# Patient Record
Sex: MALE | Race: WHITE | NOT HISPANIC OR LATINO | Employment: FULL TIME | URBAN - METROPOLITAN AREA
[De-identification: names, ages, dates, MRNs, and addresses within clinical notes are randomized per-mention and may not be internally consistent; named-entity substitution may affect disease eponyms.]

---

## 2017-10-16 ENCOUNTER — OFFICE VISIT (OUTPATIENT)
Dept: DERMATOLOGY | Facility: CLINIC | Age: 22
End: 2017-10-16
Payer: COMMERCIAL

## 2017-10-16 DIAGNOSIS — L70.9 ACNE, UNSPECIFIED ACNE TYPE: Primary | ICD-10-CM

## 2017-10-16 PROCEDURE — 99202 OFFICE O/P NEW SF 15 MIN: CPT | Mod: S$GLB,,, | Performed by: NURSE PRACTITIONER

## 2017-10-16 PROCEDURE — 99999 PR PBB SHADOW E&M-EST. PATIENT-LVL III: CPT | Mod: PBBFAC,,, | Performed by: NURSE PRACTITIONER

## 2017-10-16 RX ORDER — MINOCYCLINE HYDROCHLORIDE 90 MG/1
TABLET, FILM COATED, EXTENDED RELEASE ORAL
Qty: 30 EACH | Refills: 1 | Status: SHIPPED | OUTPATIENT
Start: 2017-10-16 | End: 2019-10-10

## 2017-10-16 RX ORDER — DAPSONE 75 MG/G
GEL TOPICAL
Qty: 60 G | Refills: 3 | Status: SHIPPED | OUTPATIENT
Start: 2017-10-16 | End: 2019-10-10

## 2017-10-16 RX ORDER — ADAPALENE AND BENZOYL PEROXIDE 3; 25 MG/G; MG/G
GEL TOPICAL
Qty: 45 G | Refills: 3 | Status: SHIPPED | OUTPATIENT
Start: 2017-10-16 | End: 2019-10-10

## 2017-10-16 NOTE — PATIENT INSTRUCTIONS

## 2017-10-16 NOTE — PROGRESS NOTES
Subjective:       Patient ID:  Nelson Burden is a 22 y.o. male who presents for   Chief Complaint   Patient presents with    Acne     Face     Acne  - Initial  Affected locations: face  Duration: 5 years  Signs / symptoms: inflamed, irritated, redness and non-healing (Scarring)  Severity: moderate  Timing: constant  Aggravated by: nothing  Relieving factors/Treatments tried: OTC acne medication  Improvement on treatment: no relief        Review of Systems   Constitutional: Negative for fever, chills, weight loss, weight gain, fatigue, night sweats and malaise.   HENT: Negative for nosebleeds and headaches.    Gastrointestinal: Negative for diarrhea and Sensitivity to oral antibiotics.        H/o bloody diarrhea. Last episode was 3/4 years ago. Has had 3 colonscopy's that were negative.   Genitourinary: Negative for irregular periods.   Musculoskeletal: Negative for arthralgias.   Skin: Positive for activity-related sunscreen use. Negative for daily sunscreen use and recent sunburn.   Neurological: Negative for headaches.   Psychiatric/Behavioral: Negative for depressed mood.   Hematologic/Lymphatic: Does not bruise/bleed easily.        Objective:    Physical Exam   Constitutional: He appears well-developed and well-nourished. No distress.   Neurological: He is alert and oriented to person, place, and time. He is not disoriented.   Psychiatric: He has a normal mood and affect.   Skin:   Areas Examined (abnormalities noted in diagram):   Head / Face Inspection Performed  Neck Inspection Performed  Chest / Axilla Inspection Performed  Back Inspection Performed  RUE Inspected  LUE Inspection Performed                   Diagram Legend     Erythematous scaling macule/papule c/w actinic keratosis       Vascular papule c/w angioma      Pigmented verrucoid papule/plaque c/w seborrheic keratosis      Yellow umbilicated papule c/w sebaceous hyperplasia      Irregularly shaped tan macule c/w lentigo     1-2 mm smooth  white papules consistent with Milia      Movable subcutaneous cyst with punctum c/w epidermal inclusion cyst      Subcutaneous movable cyst c/w pilar cyst      Firm pink to brown papule c/w dermatofibroma      Pedunculated fleshy papule(s) c/w skin tag(s)      Evenly pigmented macule c/w junctional nevus     Mildly variegated pigmented, slightly irregular-bordered macule c/w mildly atypical nevus      Flesh colored to evenly pigmented papule c/w intradermal nevus       Pink pearly papule/plaque c/w basal cell carcinoma      Erythematous hyperkeratotic cursted plaque c/w SCC      Surgical scar with no sign of skin cancer recurrence      Open and closed comedones      Inflammatory papules and pustules      Verrucoid papule consistent consistent with wart     Erythematous eczematous patches and plaques     Dystrophic onycholytic nail with subungual debris c/w onychomycosis     Umbilicated papule    Erythematous-base heme-crusted tan verrucoid plaque consistent with inflamed seborrheic keratosis     Erythematous Silvery Scaling Plaque c/w Psoriasis     See annotation      Assessment / Plan:        Acne, unspecified acne type  -     ACZONE 7.5 % GlwP; AAA face daily - bid  Dispense: 60 g; Refill: 3  -     EPIDUO FORTE 0.3-2.5 % GlwP; AAA face qhs  Dispense: 45 g; Refill: 3  -     minocycline 90 mg Tb24; 1 tab in morning with food  Dispense: 30 each; Refill: 1    Morning: wash with non medicated wash, apply panoxyl 4%, aczone for spot treatment, followed by moisturizer with spf  Evening: Wash with medicated wash, apply epiduo, and moisturizer.    Patient cautioned that Benzoyl Peroxide can act as a bleaching agent removing color from clothing, towels, pillowcases. Care should be taken to avoid contact with fabrics.     Discussed benefits and risks of minocycline therapy including but not limited to vertigo, tinnitus, lupus-like syndrome, drug-induced hepatitis, increased sun sensitivity, and blue-black pigmentation of  skin.           Return in about 8 weeks (around 12/11/2017).

## 2019-10-10 ENCOUNTER — PATIENT MESSAGE (OUTPATIENT)
Dept: INTERNAL MEDICINE | Facility: CLINIC | Age: 24
End: 2019-10-10

## 2019-10-10 ENCOUNTER — IMMUNIZATION (OUTPATIENT)
Dept: PHARMACY | Facility: CLINIC | Age: 24
End: 2019-10-10
Payer: MEDICAID

## 2019-10-10 ENCOUNTER — CLINICAL SUPPORT (OUTPATIENT)
Dept: INTERNAL MEDICINE | Facility: CLINIC | Age: 24
End: 2019-10-10
Payer: MEDICAID

## 2019-10-10 ENCOUNTER — OFFICE VISIT (OUTPATIENT)
Dept: INTERNAL MEDICINE | Facility: CLINIC | Age: 24
End: 2019-10-10
Payer: MEDICAID

## 2019-10-10 VITALS
OXYGEN SATURATION: 98 % | HEART RATE: 115 BPM | WEIGHT: 218.5 LBS | HEIGHT: 72 IN | BODY MASS INDEX: 29.59 KG/M2 | DIASTOLIC BLOOD PRESSURE: 83 MMHG | SYSTOLIC BLOOD PRESSURE: 153 MMHG

## 2019-10-10 DIAGNOSIS — Z00.00 ANNUAL PHYSICAL EXAM: Primary | ICD-10-CM

## 2019-10-10 DIAGNOSIS — F41.9 ANXIETY: ICD-10-CM

## 2019-10-10 DIAGNOSIS — R19.7 DIARRHEA, UNSPECIFIED TYPE: ICD-10-CM

## 2019-10-10 PROCEDURE — 99214 OFFICE O/P EST MOD 30 MIN: CPT | Mod: PBBFAC,25 | Performed by: FAMILY MEDICINE

## 2019-10-10 PROCEDURE — 90471 IMMUNIZATION ADMIN: CPT | Mod: PBBFAC

## 2019-10-10 PROCEDURE — 99385 PR PREVENTIVE VISIT,NEW,18-39: ICD-10-PCS | Mod: S$PBB,,, | Performed by: FAMILY MEDICINE

## 2019-10-10 PROCEDURE — 99999 PR PBB SHADOW E&M-EST. PATIENT-LVL IV: CPT | Mod: PBBFAC,,, | Performed by: FAMILY MEDICINE

## 2019-10-10 PROCEDURE — 99385 PREV VISIT NEW AGE 18-39: CPT | Mod: S$PBB,,, | Performed by: FAMILY MEDICINE

## 2019-10-10 PROCEDURE — 99999 PR PBB SHADOW E&M-EST. PATIENT-LVL I: CPT | Mod: PBBFAC,,,

## 2019-10-10 PROCEDURE — 99999 PR PBB SHADOW E&M-EST. PATIENT-LVL IV: ICD-10-PCS | Mod: PBBFAC,,, | Performed by: FAMILY MEDICINE

## 2019-10-10 PROCEDURE — 99211 OFF/OP EST MAY X REQ PHY/QHP: CPT | Mod: PBBFAC,27

## 2019-10-10 PROCEDURE — 99999 PR PBB SHADOW E&M-EST. PATIENT-LVL I: ICD-10-PCS | Mod: PBBFAC,,,

## 2019-10-10 NOTE — PATIENT INSTRUCTIONS
Please visit "[employer] and/or download the [employer] leandra on your phone for therapy options. These are great resources and helpful when searching out therapists that are affordable, around your area, and even have the ability to use while at home. Other options include contacting your insurance company to see therapists that they recommend. Please don't hesitate to ask me if any further questions arise.

## 2019-10-10 NOTE — PROGRESS NOTES
"Subjective:       Patient ID: Nelson Burden is a 24 y.o. male.    Chief Complaint: Establish Care and Employment Physical    HPI    24yoM presents to [facility].     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 Flu and tdap today as well.  Will need TB skin test but no sooner than 11/18/19    #Elevated blood pressure reading  - elevated today in clinic, ?white coat htn  - recent weight changes, put on weight  - started intermittent fasting over the past week and a new exercise regimen.    #Anxiety:  - no trouble sleeping, feels "social anxiety", started feeling in high school during jobs and "crippled" him, shaking and unable to perform.  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    #GI: mother has h/o crohn's  - formerly est with Dr. Cotter (pediatric GI) (last charted in 2011) for h/o blood stools and diarrhea. Cscope in 2011. 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 Negative for activity change.   HENT: Negative for hearing loss, rhinorrhea and trouble swallowing.    Eyes: Negative for discharge and visual disturbance.   Respiratory: Negative for chest tightness and wheezing.    Cardiovascular: Negative for chest pain and palpitations.   Gastrointestinal: Positive for diarrhea. Negative for blood in stool, constipation and vomiting.   Endocrine: Negative for polydipsia and polyuria.   Genitourinary: Negative for difficulty urinating, hematuria and urgency.   Musculoskeletal: Negative for arthralgias, joint swelling and neck pain.   Neurological: Negative for " weakness and headaches.   Psychiatric/Behavioral: Negative for confusion and dysphoric mood.         History reviewed. No pertinent past medical history.     Prior to Admission medications    Medication Sig Start Date End Date Taking? Authorizing Provider   ACZONE 7.5 % GlwP AAA face daily - bid 10/16/17   Tomasa Noble NP   EPIDUO FORTE 0.3-2.5 % GlwP AAA face qhs 10/16/17   Tomasa Noble NP   minocycline 90 mg Tb24 1 tab in morning with food 10/16/17   Tomasa Noble NP        Past medical history, surgical history, and family medical history reviewed and updated as appropriate.    Medications and allergies reviewed.     Objective:          Vitals:    10/10/19 1334   BP: (!) 153/83   BP Location: Left arm   Patient Position: Sitting   BP Method: Medium (Manual)   Pulse: (!) 115   SpO2: 98%   Weight: 99.1 kg (218 lb 7.6 oz)   Height: 6' (1.829 m)     Body mass index is 29.63 kg/m².  Physical Exam   Constitutional: He is oriented to person, place, and time. He appears well-developed and well-nourished.   Eyes: Pupils are equal, round, and reactive to light. EOM are normal.   Neck: Normal range of motion.   Cardiovascular: Normal rate, regular rhythm and normal heart sounds.   No murmur heard.  Pulmonary/Chest: Effort normal and breath sounds normal. No respiratory distress. He has no wheezes.   Abdominal: Soft. Bowel sounds are normal. He exhibits no distension. There is no tenderness.   Musculoskeletal: Normal range of motion.   Neurological: He is alert and oriented to person, place, and time.   Skin: Skin is warm. No rash noted.   Psychiatric: He has a normal mood and affect.       Lab Results   Component Value Date    WBC 6.90 10/10/2019    HGB 15.5 10/10/2019    HCT 43.7 10/10/2019     10/10/2019    CHOL 162 07/13/2011    ALT 24 10/10/2019    AST 17 10/10/2019     10/10/2019    K 4.2 10/10/2019     10/10/2019    CREATININE 1.0 10/10/2019    BUN 13 10/10/2019    CO2 28  10/10/2019       Assessment:       1. Annual physical exam    2. Diarrhea, unspecified type    3. Anxiety        Plan:   Nelson SUBRAMANIAN was seen today for establish care and employment physical.    Diagnoses and all orders for this visit:    Annual physical exam  -     Comprehensive metabolic panel; Future  -     CBC auto differential; Future  -     Lipid panel; Future  -     Hemoglobin A1c; Future  -     TSH; Future  -     Rubella antibody, IgG; Future  -     Rubeola antibody IgG; Future  -     Mumps, IgG Screen; Future  -     Hepatitis B Surface Antibody, Qual/Quant; Future  -     Varicella zoster antibody, IgG; Future  - f/u in 1 month s/t elev bp and anxiety, write down readings of home bp if checks, ?white coat htn, no symptoms today.    Diarrhea, unspecified type  - consider GI referral given symptoms in future though patient doing well currently and would like to see how symptoms progress.    Anxiety  - labs today, f/u results, recommending therapy options and patient has already started with self-mindfulness training and trying to get on regimen with exercise and proper diet, will consider meds in future.       Health maintenance reviewed with patient.     No follow-ups on file.    Serafin Driscoll MD  Family Medicine  [facility]  10/10/2019

## 2019-10-11 ENCOUNTER — TELEPHONE (OUTPATIENT)
Dept: INTERNAL MEDICINE | Facility: CLINIC | Age: 24
End: 2019-10-11

## 2019-10-11 NOTE — TELEPHONE ENCOUNTER
----- Message from Geri Silva sent at 10/10/2019  2:30 PM CDT -----  Pt is medicade cannot schedule for him.  He need a 6 week apt with Dr Driscoll.  He would like 11/18  Please schedule

## 2019-10-12 ENCOUNTER — PATIENT MESSAGE (OUTPATIENT)
Dept: INTERNAL MEDICINE | Facility: CLINIC | Age: 24
End: 2019-10-12

## 2019-10-12 DIAGNOSIS — Z23 ENCOUNTER FOR IMMUNIZATION: Primary | ICD-10-CM

## 2019-10-16 DIAGNOSIS — Z23 ENCOUNTER FOR IMMUNIZATION: Primary | ICD-10-CM

## 2019-10-16 NOTE — TELEPHONE ENCOUNTER
"Please call patient and let him know that I spoke with our pharmacist here and she said that since your insurance won't cover, I can place a referral to our Infectious Disease Department for the injection (would be covered by insurance because it takes place in a medical setting). They will call to set up an "appointment time" for this vaccination. Let me know if any questions.  "

## 2019-10-17 ENCOUNTER — CLINICAL SUPPORT (OUTPATIENT)
Dept: INFECTIOUS DISEASES | Facility: CLINIC | Age: 24
End: 2019-10-17
Payer: MEDICAID

## 2019-10-17 PROCEDURE — 90471 IMMUNIZATION ADMIN: CPT | Mod: PBBFAC

## 2019-10-23 ENCOUNTER — TELEPHONE (OUTPATIENT)
Dept: INTERNAL MEDICINE | Facility: CLINIC | Age: 24
End: 2019-10-23

## 2019-10-24 ENCOUNTER — PATIENT MESSAGE (OUTPATIENT)
Dept: INTERNAL MEDICINE | Facility: CLINIC | Age: 24
End: 2019-10-24

## 2019-10-25 ENCOUNTER — CLINICAL SUPPORT (OUTPATIENT)
Dept: INFECTIOUS DISEASES | Facility: CLINIC | Age: 24
End: 2019-10-25
Payer: MEDICAID

## 2019-10-25 DIAGNOSIS — Z23 ENCOUNTER FOR IMMUNIZATION: Primary | ICD-10-CM

## 2019-10-25 PROCEDURE — 99999 PR PBB SHADOW E&M-EST. PATIENT-LVL I: CPT | Mod: PBBFAC,,,

## 2019-10-25 PROCEDURE — 99999 PR PBB SHADOW E&M-EST. PATIENT-LVL I: ICD-10-PCS | Mod: PBBFAC,,,

## 2019-10-25 PROCEDURE — 90707 MMR VACCINE SC: CPT | Mod: PBBFAC

## 2019-10-25 PROCEDURE — 90471 IMMUNIZATION ADMIN: CPT | Mod: PBBFAC

## 2019-10-25 PROCEDURE — 99211 OFF/OP EST MAY X REQ PHY/QHP: CPT | Mod: PBBFAC

## 2019-10-25 RX ADMIN — MEASLES, MUMPS, AND RUBELLA VIRUS VACCINE LIVE 0.5 ML: 1000; 12500; 1000 INJECTION, POWDER, LYOPHILIZED, FOR SUSPENSION SUBCUTANEOUS at 10:10

## 2019-11-17 ENCOUNTER — PATIENT MESSAGE (OUTPATIENT)
Dept: INTERNAL MEDICINE | Facility: CLINIC | Age: 24
End: 2019-11-17

## 2019-11-20 ENCOUNTER — CLINICAL SUPPORT (OUTPATIENT)
Dept: INFECTIOUS DISEASES | Facility: CLINIC | Age: 24
End: 2019-11-20
Payer: MEDICAID

## 2019-11-20 ENCOUNTER — OFFICE VISIT (OUTPATIENT)
Dept: INTERNAL MEDICINE | Facility: CLINIC | Age: 24
End: 2019-11-20
Payer: MEDICAID

## 2019-11-20 VITALS
HEART RATE: 105 BPM | SYSTOLIC BLOOD PRESSURE: 162 MMHG | BODY MASS INDEX: 14 KG/M2 | HEIGHT: 72 IN | DIASTOLIC BLOOD PRESSURE: 82 MMHG | WEIGHT: 103.38 LBS | OXYGEN SATURATION: 99 %

## 2019-11-20 DIAGNOSIS — R03.0 ELEVATED BLOOD PRESSURE READING WITHOUT DIAGNOSIS OF HYPERTENSION: ICD-10-CM

## 2019-11-20 DIAGNOSIS — Z11.1 ENCOUNTER FOR PPD TEST: Primary | ICD-10-CM

## 2019-11-20 DIAGNOSIS — F41.9 ANXIETY: ICD-10-CM

## 2019-11-20 PROBLEM — L70.9 ACNE: Status: RESOLVED | Noted: 2017-10-16 | Resolved: 2019-11-20

## 2019-11-20 PROBLEM — R19.7 DIARRHEA: Status: RESOLVED | Noted: 2019-10-10 | Resolved: 2019-11-20

## 2019-11-20 PROCEDURE — 86580 TB INTRADERMAL TEST: CPT | Mod: PBBFAC

## 2019-11-20 PROCEDURE — 99999 PR PBB SHADOW E&M-EST. PATIENT-LVL III: ICD-10-PCS | Mod: PBBFAC,,, | Performed by: FAMILY MEDICINE

## 2019-11-20 PROCEDURE — 99214 OFFICE O/P EST MOD 30 MIN: CPT | Mod: S$PBB,,, | Performed by: FAMILY MEDICINE

## 2019-11-20 PROCEDURE — 99213 OFFICE O/P EST LOW 20 MIN: CPT | Mod: PBBFAC | Performed by: FAMILY MEDICINE

## 2019-11-20 PROCEDURE — 90471 IMMUNIZATION ADMIN: CPT | Mod: PBBFAC

## 2019-11-20 PROCEDURE — 99999 PR PBB SHADOW E&M-EST. PATIENT-LVL III: CPT | Mod: PBBFAC,,, | Performed by: FAMILY MEDICINE

## 2019-11-20 PROCEDURE — 99214 PR OFFICE/OUTPT VISIT, EST, LEVL IV, 30-39 MIN: ICD-10-PCS | Mod: S$PBB,,, | Performed by: FAMILY MEDICINE

## 2019-11-20 NOTE — PROGRESS NOTES
"Subjective:       Patient ID: Nelson Burden is a 24 y.o. male.    Chief Complaint: Follow-up    HPI    24yoM here for follow up. 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     #Elevated blood pressure reading wo dx htn  - elevated today in clinic, ?white coat htn  - takes at local pharmacy and averages closer to 130s/80s.  - recent increased weight  - started intermittent fasting and a new exercise regimen.     #Anxiety:  - no trouble sleeping, feels "social anxiety", started feeling in high school during jobs and "crippled" him, shaking and unable to perform.  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     #GI: mother has h/o crohn's  - formerly est with Dr. Cotter (pediatric GI) (last charted in 2011) for h/o blood stools and diarrhea. Cscope in 2011. 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   HENT: Negative for ear pain, sinus pain and sore throat.    Eyes: Negative for discharge.   Respiratory: Negative for cough, shortness of breath and wheezing.    Cardiovascular: Negative for chest pain and palpitations.   Gastrointestinal: Negative for abdominal pain, constipation, diarrhea, nausea and vomiting.   Genitourinary: Negative for difficulty urinating, dysuria and hematuria.   Musculoskeletal: Negative for back pain.   Skin: Negative for rash.   Neurological: Negative for weakness, numbness and headaches.   Psychiatric/Behavioral: Negative for dysphoric mood.         Past Medical History:   Diagnosis Date    Acne 10/16/2017    Weight #195lbs (88kg)    Diarrhea 10/10/2019        Prior to " Admission medications    Medication Sig Start Date End Date Taking? Authorizing Provider   hepatitis B virus vacc.rec,PF, (ENGERIX-B) 20 mcg/mL Syrg Inject into the muscle. 10/15/19   Dorian Rosen, [place]        Past medical history, surgical history, and family medical history reviewed and updated as appropriate.    Medications and allergies reviewed.     Objective:          Vitals:    11/20/19 1315   BP: (!) 162/82   BP Location: Left arm   Patient Position: Sitting   BP Method: Medium (Manual)   Pulse: 105   SpO2: 99%   Weight: 46.9 kg (103 lb 6.3 oz)   Height: 6' (1.829 m)     Body mass index is 14.02 kg/m².  Physical Exam   Constitutional: He is oriented to person, place, and time. He appears well-developed and well-nourished.   Eyes: Pupils are equal, round, and reactive to light. EOM are normal.   Neck: Normal range of motion.   Cardiovascular: Normal rate, regular rhythm and normal heart sounds.   No murmur heard.  Pulmonary/Chest: Effort normal and breath sounds normal. No respiratory distress. He has no wheezes.   Abdominal: Soft. Bowel sounds are normal. He exhibits no distension. There is no tenderness.   Musculoskeletal: Normal range of motion.   Neurological: He is alert and oriented to person, place, and time.   Skin: Skin is warm. No rash noted.   Psychiatric: He has a normal mood and affect.       Lab Results   Component Value Date    WBC 6.90 10/10/2019    HGB 15.5 10/10/2019    HCT 43.7 10/10/2019     10/10/2019    CHOL 235 (H) 10/10/2019    TRIG 455 (H) 10/10/2019    HDL 32 (L) 10/10/2019    ALT 24 10/10/2019    AST 17 10/10/2019     10/10/2019    K 4.2 10/10/2019     10/10/2019    CREATININE 1.0 10/10/2019    BUN 13 10/10/2019    CO2 28 10/10/2019    TSH 1.034 10/10/2019    HGBA1C 4.9 10/10/2019       Assessment:       1. Encounter for PPD test    2. Elevated blood pressure reading without diagnosis of hypertension    3. Anxiety        Plan:   Nelson SUBRAMANIAN was seen today for  follow-up.    Diagnoses and all orders for this visit:    TB skin test today, doing well overall and will continue checking every so often bp readings at local pharm, report back if trending >140/90. Anxiety well controlled today with apps and mindfulness at this time.    Encounter for PPD test  -     POCT TB Skin Test Read    Elevated blood pressure reading without diagnosis of hypertension    Anxiety        Health maintenance reviewed with patient.     Follow up in about 1 year (around 11/20/2020).    Serafin Driscoll MD  Family Medicine  [facility]  11/20/2019

## 2019-11-22 ENCOUNTER — CLINICAL SUPPORT (OUTPATIENT)
Dept: INTERNAL MEDICINE | Facility: CLINIC | Age: 24
End: 2019-11-22
Payer: MEDICAID

## 2019-11-22 LAB
TB INDURATION - 48 HR READ: 0 MM
TB INDURATION - 72 HR READ: NORMAL
TB SKIN TEST - 48 HR READ: NEGATIVE
TB SKIN TEST - 72 HR READ: NORMAL

## 2019-11-22 NOTE — PROGRESS NOTES
"Pt in for ppd reading. PPD negative to right arm. "0"mm duration. Results printed and given to pt.  "

## 2019-11-25 ENCOUNTER — PATIENT MESSAGE (OUTPATIENT)
Dept: INTERNAL MEDICINE | Facility: CLINIC | Age: 24
End: 2019-11-25

## 2019-11-26 ENCOUNTER — CLINICAL SUPPORT (OUTPATIENT)
Dept: INFECTIOUS DISEASES | Facility: CLINIC | Age: 24
End: 2019-11-26
Payer: MEDICAID

## 2019-11-26 PROCEDURE — 90707 MMR VACCINE SC: CPT | Mod: PBBFAC

## 2019-12-09 ENCOUNTER — CLINICAL SUPPORT (OUTPATIENT)
Dept: INTERNAL MEDICINE | Facility: CLINIC | Age: 24
End: 2019-12-09
Payer: MEDICAID

## 2019-12-09 DIAGNOSIS — Z11.1 SCREENING FOR TUBERCULOSIS: Primary | ICD-10-CM

## 2019-12-09 PROCEDURE — 86580 TB INTRADERMAL TEST: CPT | Mod: PBBFAC

## 2019-12-11 ENCOUNTER — CLINICAL SUPPORT (OUTPATIENT)
Dept: INTERNAL MEDICINE | Facility: CLINIC | Age: 24
End: 2019-12-11
Payer: MEDICAID

## 2020-01-13 PROBLEM — Z00.00 ANNUAL PHYSICAL EXAM: Status: RESOLVED | Noted: 2019-10-10 | Resolved: 2020-01-13

## 2021-01-04 ENCOUNTER — PATIENT MESSAGE (OUTPATIENT)
Dept: ADMINISTRATIVE | Facility: HOSPITAL | Age: 26
End: 2021-01-04

## 2021-03-31 ENCOUNTER — IMMUNIZATION (OUTPATIENT)
Dept: PHARMACY | Facility: CLINIC | Age: 26
End: 2021-03-31
Payer: MEDICAID

## 2021-03-31 DIAGNOSIS — Z23 NEED FOR VACCINATION: Primary | ICD-10-CM

## 2021-04-05 ENCOUNTER — PATIENT MESSAGE (OUTPATIENT)
Dept: ADMINISTRATIVE | Facility: HOSPITAL | Age: 26
End: 2021-04-05

## 2021-04-28 ENCOUNTER — IMMUNIZATION (OUTPATIENT)
Dept: PHARMACY | Facility: CLINIC | Age: 26
End: 2021-04-28
Payer: MEDICAID

## 2021-04-28 DIAGNOSIS — Z23 NEED FOR VACCINATION: Primary | ICD-10-CM

## 2021-07-07 ENCOUNTER — PATIENT MESSAGE (OUTPATIENT)
Dept: ADMINISTRATIVE | Facility: HOSPITAL | Age: 26
End: 2021-07-07

## 2021-10-04 ENCOUNTER — PATIENT MESSAGE (OUTPATIENT)
Dept: ADMINISTRATIVE | Facility: HOSPITAL | Age: 26
End: 2021-10-04

## 2022-01-26 ENCOUNTER — PATIENT MESSAGE (OUTPATIENT)
Dept: ADMINISTRATIVE | Facility: HOSPITAL | Age: 27
End: 2022-01-26
Payer: MEDICAID

## 2022-03-16 ENCOUNTER — PATIENT MESSAGE (OUTPATIENT)
Dept: ADMINISTRATIVE | Facility: HOSPITAL | Age: 27
End: 2022-03-16
Payer: MEDICAID

## 2023-01-31 ENCOUNTER — TELEPHONE (OUTPATIENT)
Dept: INTERNAL MEDICINE | Facility: CLINIC | Age: 28
End: 2023-01-31
Payer: MEDICAID

## 2023-01-31 ENCOUNTER — PATIENT MESSAGE (OUTPATIENT)
Dept: INTERNAL MEDICINE | Facility: CLINIC | Age: 28
End: 2023-01-31
Payer: MEDICAID

## 2023-02-02 ENCOUNTER — OFFICE VISIT (OUTPATIENT)
Dept: INTERNAL MEDICINE | Facility: CLINIC | Age: 28
End: 2023-02-02
Payer: MEDICAID

## 2023-02-02 VITALS
BODY MASS INDEX: 30.4 KG/M2 | HEIGHT: 72 IN | SYSTOLIC BLOOD PRESSURE: 136 MMHG | HEART RATE: 88 BPM | OXYGEN SATURATION: 97 % | WEIGHT: 224.44 LBS | DIASTOLIC BLOOD PRESSURE: 92 MMHG

## 2023-02-02 DIAGNOSIS — F90.2 ATTENTION DEFICIT HYPERACTIVITY DISORDER (ADHD), COMBINED TYPE: Primary | ICD-10-CM

## 2023-02-02 PROCEDURE — 99214 OFFICE O/P EST MOD 30 MIN: CPT | Mod: PBBFAC

## 2023-02-02 PROCEDURE — 3075F SYST BP GE 130 - 139MM HG: CPT | Mod: CPTII,,,

## 2023-02-02 PROCEDURE — 99203 PR OFFICE/OUTPT VISIT, NEW, LEVL III, 30-44 MIN: ICD-10-PCS | Mod: S$PBB,,,

## 2023-02-02 PROCEDURE — 1159F MED LIST DOCD IN RCRD: CPT | Mod: CPTII,,,

## 2023-02-02 PROCEDURE — 99203 OFFICE O/P NEW LOW 30 MIN: CPT | Mod: S$PBB,,,

## 2023-02-02 PROCEDURE — 3075F PR MOST RECENT SYSTOLIC BLOOD PRESS GE 130-139MM HG: ICD-10-PCS | Mod: CPTII,,,

## 2023-02-02 PROCEDURE — 3080F PR MOST RECENT DIASTOLIC BLOOD PRESSURE >= 90 MM HG: ICD-10-PCS | Mod: CPTII,,,

## 2023-02-02 PROCEDURE — 3008F BODY MASS INDEX DOCD: CPT | Mod: CPTII,,,

## 2023-02-02 PROCEDURE — 3008F PR BODY MASS INDEX (BMI) DOCUMENTED: ICD-10-PCS | Mod: CPTII,,,

## 2023-02-02 PROCEDURE — 1159F PR MEDICATION LIST DOCUMENTED IN MEDICAL RECORD: ICD-10-PCS | Mod: CPTII,,,

## 2023-02-02 PROCEDURE — 99999 PR PBB SHADOW E&M-EST. PATIENT-LVL IV: CPT | Mod: PBBFAC,,,

## 2023-02-02 PROCEDURE — 3080F DIAST BP >= 90 MM HG: CPT | Mod: CPTII,,,

## 2023-02-02 PROCEDURE — 99999 PR PBB SHADOW E&M-EST. PATIENT-LVL IV: ICD-10-PCS | Mod: PBBFAC,,,

## 2023-02-02 NOTE — PATIENT INSTRUCTIONS
- Call [phone] is the [facility] referral number to call     - Call [phone] is the outside number     - Look up the [facility] group     - Start looking for psychiatrists near where you are moving now to start the process    - Keep track of your blood pressures at home over the next week and [facility]

## 2023-02-02 NOTE — PROGRESS NOTES
Clinic Note  2/2/2023      Subjective:       Patient ID:  Nelson is a 27 y.o. male being seen for referral to psychiatry for ADHD management.     He reports that he was prescribed Concerta by psychiatry in the past after an ADHD workup but lost his insurance when he turned 27 years old and is in need of a referral. He reports in school he was able to cope through poor homework compliance by making good test scores but was unable to cope once the course load increased.  He also has trouble, with focus at home and completing housework and keeping track of his tasks.       Past Medical History:   Diagnosis Date    Acne 10/16/2017    Weight #195lbs (88kg)    Diarrhea 10/10/2019       No past surgical history on file.    Family History   Problem Relation Age of Onset    Crohn's disease Mother     Depression Father     Bipolar disorder Father     Melanoma Neg Hx        Social History     Socioeconomic History    Marital status:    Tobacco Use    Smoking status: Every Day     Types: Vaping with nicotine    Smokeless tobacco: Current   Substance and Sexual Activity    Alcohol use: Yes    Drug use: Not Currently     Types: Marijuana    Sexual activity: Yes     Partners: Female       Review of Systems   Constitutional:  Negative for chills, diaphoresis, fever and weight loss.   HENT:  Negative for congestion, hearing loss, sinus pain and sore throat.    Eyes:  Negative for blurred vision, double vision and photophobia.   Respiratory:  Negative for cough, shortness of breath and wheezing.    Cardiovascular:  Negative for chest pain, palpitations, orthopnea, leg swelling and PND.   Gastrointestinal:  Negative for abdominal pain, constipation, diarrhea, nausea and vomiting.   Genitourinary:  Negative for dysuria, flank pain and hematuria.   Musculoskeletal:  Negative for falls, joint pain and neck pain.   Skin:  Negative for rash.   Neurological:  Negative for dizziness, tingling, sensory change and headaches.      Medication List with Changes/Refills   Current Medications    HEPATITIS B VIRUS VACC.REC,PF, (ENGERIX-B) 20 MCG/ML SYRG    Inject into the muscle.       Patient Active Problem List   Diagnosis    Anxiety               Objective:      BP (!) 136/92 (BP Location: Right arm, Patient Position: Sitting, BP Method: Large (Manual))   Pulse 88   Ht 6' (1.829 m)   Wt 101.8 kg (224 lb 6.9 oz)   SpO2 97%   BMI 30.44 kg/m²   Estimated body mass index is 30.44 kg/m² as calculated from the following:    Height as of this encounter: 6' (1.829 m).    Weight as of this encounter: 101.8 kg (224 lb 6.9 oz).      Physical Exam  Constitutional:       Appearance: Normal appearance. He is normal weight.   HENT:      Head: Atraumatic.      Right Ear: External ear normal.      Left Ear: External ear normal.      Nose: Nose normal. No congestion or rhinorrhea.      Mouth/Throat:      Mouth: Mucous membranes are moist.      Pharynx: Oropharynx is clear.   Eyes:      General: No scleral icterus.     Extraocular Movements: Extraocular movements intact.      Conjunctiva/sclera: Conjunctivae normal.   Cardiovascular:      Rate and Rhythm: Normal rate and regular rhythm.      Pulses: Normal pulses.      Heart sounds: Normal heart sounds. No murmur heard.    No gallop.   Pulmonary:      Effort: Pulmonary effort is normal.      Breath sounds: Normal breath sounds. No wheezing or rales.   Abdominal:      General: Abdomen is flat. There is no distension.      Palpations: Abdomen is soft.      Tenderness: There is no abdominal tenderness.   Musculoskeletal:         General: No swelling or tenderness. Normal range of motion.      Cervical back: Normal range of motion and neck supple. No tenderness.      Right lower leg: No edema.      Left lower leg: No edema.   Skin:     General: Skin is warm and dry.      Capillary Refill: Capillary refill takes less than 2 seconds.      Coloration: Skin is not jaundiced.      Findings: No rash.    Neurological:      General: No focal deficit present.      Mental Status: He is alert and oriented to person, place, and time. Mental status is at baseline.   Psychiatric:         Mood and Affect: Mood normal.         Behavior: Behavior normal.         Assessment and Plan:         Problem List Items Addressed This Visit    None  Visit Diagnoses       Attention deficit hyperactivity disorder (ADHD), combined type    -  Primary    Relevant Orders    Ambulatory referral/consult to Psychiatry    Ambulatory referral/consult to Psychiatry            Follow Up:       Nelson SUBRAMANIAN was seen today for referral.    Diagnoses and all orders for this visit:    Attention deficit hyperactivity disorder (ADHD), combined type  -     Ambulatory referral/consult to Psychiatry; Future  -     Ambulatory referral/consult to Psychiatry; Future            Other Orders Placed This Visit:  Orders Placed This Encounter   Procedures    Ambulatory referral/consult to Psychiatry    Ambulatory referral/consult to Psychiatry             Interview, Assessment, Findings, and Plan discussed with Dr. Stahl     Follow up in about 3 months (around 5/2/2023).    Nash Deal DO, MS   Internal Medicine

## 2023-08-20 PROCEDURE — 87081 CULTURE SCREEN ONLY: CPT | Performed by: STUDENT IN AN ORGANIZED HEALTH CARE EDUCATION/TRAINING PROGRAM

## 2024-01-31 ENCOUNTER — OFFICE VISIT (OUTPATIENT)
Dept: INTERNAL MEDICINE | Facility: CLINIC | Age: 29
End: 2024-01-31
Payer: MEDICAID

## 2024-01-31 VITALS
SYSTOLIC BLOOD PRESSURE: 140 MMHG | WEIGHT: 188.2 LBS | BODY MASS INDEX: 25.49 KG/M2 | HEART RATE: 79 BPM | HEIGHT: 72 IN | TEMPERATURE: 97.3 F | OXYGEN SATURATION: 98 % | DIASTOLIC BLOOD PRESSURE: 90 MMHG

## 2024-01-31 DIAGNOSIS — Z00.00 ANNUAL PHYSICAL EXAM: Primary | ICD-10-CM

## 2024-01-31 DIAGNOSIS — F90.9 ATTENTION DEFICIT HYPERACTIVITY DISORDER (ADHD), UNSPECIFIED ADHD TYPE: ICD-10-CM

## 2024-01-31 DIAGNOSIS — Z72.89 CURRENT EVERY DAY VAPING: ICD-10-CM

## 2024-01-31 DIAGNOSIS — R53.82 CHRONIC FATIGUE: ICD-10-CM

## 2024-01-31 DIAGNOSIS — R79.89 LOW SERUM TESTOSTERONE LEVEL: Primary | ICD-10-CM

## 2024-01-31 LAB
25(OH)D3 SERPL-MCNC: 24 NG/ML (ref 30–100)
ALBUMIN SERPL-MCNC: 4.9 G/DL (ref 3.5–5.2)
ALBUMIN/GLOB SERPL: 1.9 G/DL
ALP SERPL-CCNC: 75 U/L (ref 39–117)
ALT SERPL W P-5'-P-CCNC: 19 U/L (ref 1–41)
ANION GAP SERPL CALCULATED.3IONS-SCNC: 14.6 MMOL/L (ref 5–15)
AST SERPL-CCNC: 19 U/L (ref 1–40)
BASOPHILS # BLD AUTO: 0.04 10*3/MM3 (ref 0–0.2)
BASOPHILS NFR BLD AUTO: 0.6 % (ref 0–1.5)
BILIRUB SERPL-MCNC: 0.7 MG/DL (ref 0–1.2)
BUN SERPL-MCNC: 16 MG/DL (ref 6–20)
BUN/CREAT SERPL: 17.8 (ref 7–25)
CALCIUM SPEC-SCNC: 9.6 MG/DL (ref 8.6–10.5)
CHLORIDE SERPL-SCNC: 100 MMOL/L (ref 98–107)
CHOLEST SERPL-MCNC: 212 MG/DL (ref 0–200)
CO2 SERPL-SCNC: 24.4 MMOL/L (ref 22–29)
CREAT SERPL-MCNC: 0.9 MG/DL (ref 0.76–1.27)
DEPRECATED RDW RBC AUTO: 40.7 FL (ref 37–54)
EGFRCR SERPLBLD CKD-EPI 2021: 120.8 ML/MIN/1.73
EOSINOPHIL # BLD AUTO: 0.05 10*3/MM3 (ref 0–0.4)
EOSINOPHIL NFR BLD AUTO: 0.8 % (ref 0.3–6.2)
ERYTHROCYTE [DISTWIDTH] IN BLOOD BY AUTOMATED COUNT: 12.5 % (ref 12.3–15.4)
GLOBULIN UR ELPH-MCNC: 2.6 GM/DL
GLUCOSE SERPL-MCNC: 83 MG/DL (ref 65–99)
HCT VFR BLD AUTO: 44.9 % (ref 37.5–51)
HCV AB SER DONR QL: NORMAL
HDLC SERPL-MCNC: 43 MG/DL (ref 40–60)
HGB BLD-MCNC: 15.5 G/DL (ref 13–17.7)
IMM GRANULOCYTES # BLD AUTO: 0.01 10*3/MM3 (ref 0–0.05)
IMM GRANULOCYTES NFR BLD AUTO: 0.2 % (ref 0–0.5)
LDLC SERPL CALC-MCNC: 151 MG/DL (ref 0–100)
LDLC/HDLC SERPL: 3.48 {RATIO}
LYMPHOCYTES # BLD AUTO: 2.43 10*3/MM3 (ref 0.7–3.1)
LYMPHOCYTES NFR BLD AUTO: 37.6 % (ref 19.6–45.3)
MCH RBC QN AUTO: 31.1 PG (ref 26.6–33)
MCHC RBC AUTO-ENTMCNC: 34.5 G/DL (ref 31.5–35.7)
MCV RBC AUTO: 90 FL (ref 79–97)
MONOCYTES # BLD AUTO: 0.78 10*3/MM3 (ref 0.1–0.9)
MONOCYTES NFR BLD AUTO: 12.1 % (ref 5–12)
NEUTROPHILS NFR BLD AUTO: 3.16 10*3/MM3 (ref 1.7–7)
NEUTROPHILS NFR BLD AUTO: 48.7 % (ref 42.7–76)
NRBC BLD AUTO-RTO: 0 /100 WBC (ref 0–0.2)
PLATELET # BLD AUTO: 249 10*3/MM3 (ref 140–450)
PMV BLD AUTO: 9.1 FL (ref 6–12)
POTASSIUM SERPL-SCNC: 4.2 MMOL/L (ref 3.5–5.2)
PROT SERPL-MCNC: 7.5 G/DL (ref 6–8.5)
RBC # BLD AUTO: 4.99 10*6/MM3 (ref 4.14–5.8)
SODIUM SERPL-SCNC: 139 MMOL/L (ref 136–145)
TESTOST SERPL-MCNC: 226 NG/DL (ref 249–836)
TRIGL SERPL-MCNC: 97 MG/DL (ref 0–150)
TSH SERPL DL<=0.05 MIU/L-ACNC: 1.81 UIU/ML (ref 0.27–4.2)
VLDLC SERPL-MCNC: 18 MG/DL (ref 5–40)
WBC NRBC COR # BLD AUTO: 6.47 10*3/MM3 (ref 3.4–10.8)

## 2024-01-31 PROCEDURE — 84403 ASSAY OF TOTAL TESTOSTERONE: CPT | Performed by: NURSE PRACTITIONER

## 2024-01-31 PROCEDURE — 86803 HEPATITIS C AB TEST: CPT | Performed by: NURSE PRACTITIONER

## 2024-01-31 PROCEDURE — 82306 VITAMIN D 25 HYDROXY: CPT | Performed by: NURSE PRACTITIONER

## 2024-01-31 PROCEDURE — 84443 ASSAY THYROID STIM HORMONE: CPT | Performed by: NURSE PRACTITIONER

## 2024-01-31 PROCEDURE — 80061 LIPID PANEL: CPT | Performed by: NURSE PRACTITIONER

## 2024-01-31 PROCEDURE — 80053 COMPREHEN METABOLIC PANEL: CPT | Performed by: NURSE PRACTITIONER

## 2024-01-31 PROCEDURE — 85025 COMPLETE CBC W/AUTO DIFF WBC: CPT | Performed by: NURSE PRACTITIONER

## 2024-01-31 RX ORDER — ERGOCALCIFEROL 1.25 MG/1
CAPSULE ORAL
Qty: 13 CAPSULE | Refills: 0 | Status: SHIPPED | OUTPATIENT
Start: 2024-01-31

## 2024-01-31 NOTE — PROGRESS NOTES
"Chief Complaint  Establish Care (New patient. The patient has no chief complaints. He would like to discuss testosterone levels. He wants blood work today. )  Subjective    History of Present Illness  Gaetano Nash is a 28 y.o. male who presents to [facility] INTERNAL MEDICINE:       1.  To establish care. Moved to the area in the summer.     2.  For His annual physical exam. He is experiencing chronic fatigue and sleepy during the day.    No current outpatient medications on file.  No Known Allergies   Past Medical History:   Diagnosis Date    ADHD (attention deficit hyperactivity disorder)       Past Surgical History:   Procedure Laterality Date    COLONOSCOPY          Objective   /90 (BP Location: Right arm, Patient Position: Sitting, Cuff Size: Adult)   Pulse 79   Temp 97.3 °F (36.3 °C) (Temporal)   Ht 182.9 cm (72\")   Wt 85.4 kg (188 lb 3.2 oz)   SpO2 98%   BMI 25.52 kg/m²    Estimated body mass index is 25.52 kg/m² as calculated from the following:    Height as of this encounter: 182.9 cm (72\").    Weight as of this encounter: 85.4 kg (188 lb 3.2 oz).     Physical Exam  Vitals reviewed.   Constitutional:       General: He is not in acute distress.  HENT:      Head: Normocephalic and atraumatic.      Right Ear: Tympanic membrane and ear canal normal.      Left Ear: Tympanic membrane and ear canal normal.   Eyes:      Conjunctiva/sclera: Conjunctivae normal.   Cardiovascular:      Rate and Rhythm: Normal rate and regular rhythm.      Heart sounds: Normal heart sounds. No murmur heard.  Pulmonary:      Effort: Pulmonary effort is normal.      Breath sounds: Normal breath sounds. No wheezing, rhonchi or rales.   Abdominal:      General: There is no distension.      Palpations: Abdomen is soft. There is no mass.      Tenderness: There is no abdominal tenderness.   Musculoskeletal:      Right lower leg: No edema.      Left lower leg: No edema.   Lymphadenopathy:      Cervical: No " cervical adenopathy.   Skin:     General: Skin is warm and dry.      Coloration: Skin is not jaundiced or pale.   Neurological:      General: No focal deficit present.      Mental Status: He is alert.   Psychiatric:         Mood and Affect: Mood normal.         Thought Content: Thought content normal.             Assessment and Plan   Diagnoses and all orders for this visit:    1. Annual physical exam (Primary)  -     CBC & Differential  -     Comprehensive Metabolic Panel  -     Lipid Panel  -     Hepatitis C Antibody  -     TSH Rfx On Abnormal To Free T4    2. Chronic fatigue  -     Testosterone  -     Vitamin D,25-Hydroxy    3. Current every day vaping    4. Attention deficit hyperactivity disorder (ADHD), unspecified ADHD type  -     Ambulatory Referral to Psychiatry      Discussed healthy diet, exercise, adequate sleep, cancer screening, immunizations and preventative care. Annual eye exam and twice yearly dental cleaning.    BMI is >= 25 and <30. (Overweight) The following options were offered after discussion;: weight loss educational material (shared in after visit summary), exercise counseling/recommendations, and nutrition counseling/recommendations       Patient was given instructions and counseling regarding his condition or for health maintenance advice. Please see specific information pulled into the AVS if appropriate.     Follow Up   Return in about 1 year (around 1/31/2025) for Annual physical.    Dictated Utilizing Dragon Dictation.  Please note that portions of this note were completed with a voice recognition program.  Part of this note may be an electronic transcription/translation of spoken language to printed text using the Dragon Dictation System.    WILLY Sanabria

## 2024-02-02 ENCOUNTER — PATIENT ROUNDING (BHMG ONLY) (OUTPATIENT)
Dept: INTERNAL MEDICINE | Facility: CLINIC | Age: 29
End: 2024-02-02
Payer: MEDICAID

## 2024-02-16 ENCOUNTER — OFFICE VISIT (OUTPATIENT)
Dept: ENDOCRINOLOGY | Age: 29
End: 2024-02-16
Payer: COMMERCIAL

## 2024-02-16 VITALS
HEIGHT: 72 IN | DIASTOLIC BLOOD PRESSURE: 70 MMHG | WEIGHT: 185 LBS | OXYGEN SATURATION: 99 % | HEART RATE: 94 BPM | TEMPERATURE: 97.5 F | BODY MASS INDEX: 25.06 KG/M2 | SYSTOLIC BLOOD PRESSURE: 128 MMHG

## 2024-02-16 DIAGNOSIS — R53.82 CHRONIC FATIGUE: ICD-10-CM

## 2024-02-16 DIAGNOSIS — N62 GYNECOMASTIA: ICD-10-CM

## 2024-02-16 DIAGNOSIS — R79.89 LOW TESTOSTERONE: Primary | ICD-10-CM

## 2024-02-16 DIAGNOSIS — E55.9 VITAMIN D DEFICIENCY: ICD-10-CM

## 2024-02-19 ENCOUNTER — LAB (OUTPATIENT)
Dept: LAB | Facility: HOSPITAL | Age: 29
End: 2024-02-19
Payer: COMMERCIAL

## 2024-02-19 DIAGNOSIS — R53.82 CHRONIC FATIGUE: ICD-10-CM

## 2024-02-19 DIAGNOSIS — R79.89 LOW TESTOSTERONE: ICD-10-CM

## 2024-02-19 LAB
ALBUMIN SERPL-MCNC: 4.3 G/DL (ref 3.5–5.2)
ALBUMIN/GLOB SERPL: 1.7 G/DL
ALP SERPL-CCNC: 70 U/L (ref 39–117)
ALT SERPL W P-5'-P-CCNC: 18 U/L (ref 1–41)
ANION GAP SERPL CALCULATED.3IONS-SCNC: 10.1 MMOL/L (ref 5–15)
AST SERPL-CCNC: 16 U/L (ref 1–40)
BILIRUB SERPL-MCNC: 0.3 MG/DL (ref 0–1.2)
BUN SERPL-MCNC: 12 MG/DL (ref 6–20)
BUN/CREAT SERPL: 13.8 (ref 7–25)
CALCIUM SPEC-SCNC: 9.2 MG/DL (ref 8.6–10.5)
CHLORIDE SERPL-SCNC: 103 MMOL/L (ref 98–107)
CO2 SERPL-SCNC: 27.9 MMOL/L (ref 22–29)
CORTIS AM PEAK SERPL-MCNC: 21.12 MCG/DL (ref 6.02–18.4)
CREAT SERPL-MCNC: 0.87 MG/DL (ref 0.76–1.27)
EGFRCR SERPLBLD CKD-EPI 2021: 122 ML/MIN/1.73
ESTRADIOL SERPL HS-MCNC: 18.8 PG/ML
FSH SERPL-ACNC: 6.01 MIU/ML
GLOBULIN UR ELPH-MCNC: 2.6 GM/DL
GLUCOSE SERPL-MCNC: 91 MG/DL (ref 65–99)
LH SERPL-ACNC: 5.37 MIU/ML
POTASSIUM SERPL-SCNC: 4.5 MMOL/L (ref 3.5–5.2)
PROLACTIN SERPL-MCNC: 24.3 NG/ML (ref 4.04–15.2)
PROT SERPL-MCNC: 6.9 G/DL (ref 6–8.5)
SODIUM SERPL-SCNC: 141 MMOL/L (ref 136–145)
TESTOST SERPL-MCNC: 334 NG/DL (ref 249–836)

## 2024-02-19 PROCEDURE — 83002 ASSAY OF GONADOTROPIN (LH): CPT

## 2024-02-19 PROCEDURE — 36415 COLL VENOUS BLD VENIPUNCTURE: CPT

## 2024-02-19 PROCEDURE — 80053 COMPREHEN METABOLIC PANEL: CPT

## 2024-02-19 PROCEDURE — 82670 ASSAY OF TOTAL ESTRADIOL: CPT

## 2024-02-19 PROCEDURE — 82533 TOTAL CORTISOL: CPT

## 2024-02-19 PROCEDURE — 84146 ASSAY OF PROLACTIN: CPT

## 2024-02-19 PROCEDURE — 84270 ASSAY OF SEX HORMONE GLOBUL: CPT

## 2024-02-19 PROCEDURE — 84403 ASSAY OF TOTAL TESTOSTERONE: CPT

## 2024-02-19 PROCEDURE — 83001 ASSAY OF GONADOTROPIN (FSH): CPT

## 2024-02-19 PROCEDURE — 82024 ASSAY OF ACTH: CPT

## 2024-02-20 ENCOUNTER — TELEPHONE (OUTPATIENT)
Dept: ENDOCRINOLOGY | Age: 29
End: 2024-02-20
Payer: COMMERCIAL

## 2024-02-20 DIAGNOSIS — R79.89 ELEVATED PROLACTIN LEVEL: Primary | ICD-10-CM

## 2024-02-20 LAB
ACTH PLAS-MCNC: 64.9 PG/ML (ref 7.2–63.3)
SHBG SERPL-SCNC: 14.5 NMOL/L (ref 16.5–55.9)

## 2024-02-20 NOTE — TELEPHONE ENCOUNTER
Called to discuss the blood test result, patient did not answer left a voicemail  Will send him a MyChart message  Based on prolactin level will order pituitary MRI

## 2024-02-21 NOTE — TELEPHONE ENCOUNTER
Provider: DR NOKHBEHZAEIM    Caller: WHITNEY MUNOZ    Relationship to Patient: SELF    Reason for Call: PATIENT WOULD LIKE TO GO AHEAD AND SCHEDULE THE PITUITARY MRI. PLEASE ADVISE

## 2024-02-22 NOTE — TELEPHONE ENCOUNTER
2/22 called and left detailed message for pt that a ref has been put in for the MRI and they will be contacting him reg an appt

## 2024-05-20 ENCOUNTER — PATIENT ROUNDING (BHMG ONLY) (OUTPATIENT)
Dept: URGENT CARE | Facility: CLINIC | Age: 29
End: 2024-05-20
Payer: COMMERCIAL

## 2024-05-20 NOTE — ED NOTES
Thank you for letting us care for you in your recent visit to our urgent care center. We would love to hear about your experience with us. Was this the first time you have visited our location?    We’re always looking for ways to make our patients’ experiences even better. Do you have any recommendations on ways we may improve?     I appreciate you taking the time to respond. Please be on the lookout for a survey about your recent visit from [employer] via text or email. We would greatly appreciate if you could fill that out and turn it back in. We want your voice to be heard and we value your feedback.   Thank you for choosing [facility] for your healthcare needs.

## 2024-10-29 NOTE — PROGRESS NOTES
Chief Complaint  Follow-up (The patient would like a check-up, labs including testosterone, would like respiratory status checked,  would like a referral for ADHD. )    Subjective      History of Present Illness  The patient is a 29-year-old male who presents for evaluation of multiple medical concerns.    He is seeking a referral for an ADHD evaluation and wishes to have his blood work done. He reports constant fatigue, even falling asleep while standing at work. Despite sleeping for 7 hours last night, he feels his sleep quality could be improved. He also reports stress.    He has been experiencing trigger finger, which causes his fingers to get stuck, particularly in the morning and at night, accompanied by pain.    He had his testosterone levels checked last year, which were found to be low. A specialist in [place] confirmed this with a second test. An MRI was recommended to rule out an enlarged pituitary, but he was unable to afford it at the time. He is now interested in having his testosterone levels rechecked. He was previously prescribed vitamin D but has not been taking it since running out.    He is interested in assessing his lung health. He has been vaping since he was 16 or 17 years old, consuming about 4 vapes a month for the past 10 or 11 years. He recently started a new job as a , working 8 to 12 hours a day, and has noticed black residue in his nose after work. He does not report any breathing difficulties.    FAMILY HISTORY  His grandfather had diabetes.       Past Medical History:   Diagnosis Date    ADHD (attention deficit hyperactivity disorder)     Low testosterone 02/16/2024    Vitamin D deficiency 02/16/2024        Past Surgical History:   Procedure Laterality Date    COLONOSCOPY          Social History     Tobacco Use   Smoking Status Former    Current packs/day: 1.00    Average packs/day: 1 pack/day for 10.8 years (10.8 ttl pk-yrs)    Types: Cigarettes    Start date: 1/6/2014     "Passive exposure: Never   Smokeless Tobacco Never   Tobacco Comments    5 days nicotine free as of 5/17/24        Patient Care Team:  Carolina Swan APRN as PCP - General (Nurse Practitioner)  Nokhbehzaeim, Mahrokh, MD as Consulting Physician (Endocrinology)    No Known Allergies     No current outpatient medications on file.    Objective     Vitals:    11/05/24 1117   BP: 145/90   BP Location: Right arm   Patient Position: Sitting   Cuff Size: Adult   Pulse: 89   Temp: 98.4 °F (36.9 °C)   TempSrc: Temporal   SpO2: 100%   Weight: 84.6 kg (186 lb 9.6 oz)   Height: 182.9 cm (72\")        Wt Readings from Last 3 Encounters:   11/05/24 84.6 kg (186 lb 9.6 oz)   05/17/24 78.8 kg (173 lb 12.8 oz)   02/16/24 83.9 kg (185 lb)        BP Readings from Last 3 Encounters:   11/05/24 145/90   05/17/24 132/85   02/16/24 128/70        Physical Exam  Vitals reviewed.   Constitutional:       General: He is not in acute distress.  HENT:      Head: Normocephalic and atraumatic.   Neck:      Thyroid: No thyromegaly.   Cardiovascular:      Rate and Rhythm: Normal rate and regular rhythm.   Pulmonary:      Effort: Pulmonary effort is normal.      Breath sounds: Normal breath sounds. No wheezing, rhonchi or rales.   Lymphadenopathy:      Cervical: No cervical adenopathy.   Neurological:      General: No focal deficit present.      Mental Status: He is alert.   Psychiatric:         Mood and Affect: Mood normal.         Thought Content: Thought content normal.                Result Review   The following data was reviewed by: WILLY Sanabria on 11/05/2024:  [x]  Tests & Results  []  Hospitalization/Emergency Department/Urgent Care  [x]  Internal/External Consultant Notes       Assessment and Plan     Diagnoses and all orders for this visit:    1. Attention deficit hyperactivity disorder (ADHD), unspecified ADHD type (Primary)  -     Cancel: Ambulatory Referral to Psychiatry  -     Ambulatory Referral to Psychiatry    2. Low serum " testosterone level  -     Testosterone, Total, Women, Children, and Hypogonadal Males; Future  -     Ambulatory Referral to Endocrinology  -     FSH & LH; Future  -     Sex Horm Binding Globulin; Future    3. Elevated prolactin level    4. Vitamin D deficiency  -     Vitamin D,25-Hydroxy; Future    5. Elevated blood-pressure reading without diagnosis of hypertension    6. Current every day vaping    7. Fatigue, unspecified type  -     Vitamin B12; Future  -     Folate; Future  -     Comprehensive Metabolic Panel; Future  -     CBC & Differential; Future  -     Hemoglobin A1c; Future  -     TSH Rfx On Abnormal To Free T4; Future  -     Prolactin; Future  -     Cortisol - AM; Future    8. Need for influenza vaccination  -     Fluzone >6mos ([id number])         Assessment & Plan  1. ADHD.  A referral to a psychiatrist was made for ADHD evaluation. He can contact Dr. Del Valle or Dr. Sosa at [facility] to set up an appointment.    2. Low Testosterone.  His testosterone level was within normal range 8 months ago but was previously low. Blood work was ordered to reassess testosterone levels in the morning. He is advised to follow up with the endocrinologist in [place] for further evaluation.    3. Elevated Prolactin.  His prolactin level was elevated previously.  MRI not done due to cost.  Contact endocrinology to see if they wish to reorder the MRI.  Blood work was ordered to reassess prolactin levels.    4. Low Vitamin D.  His vitamin D level was previously low. He was on vitamin D medication but has not been taking it recently. Blood work was ordered to reassess vitamin D levels. If his vitamin D level is found to be low, a prescription for vitamin D will be provided.    5.  Elevated blood pressure reading.  His blood pressure is slightly high, likely due to stress and vaping. He was advised to cease vaping.    6. Smoking/Vaping.  He was advised to cease vaping. Information regarding the risks associated  with vaping was provided via [employer].    7. Fatigue.  He reports persistent fatigue and falling asleep while standing at work. Blood work was ordered to assess morning cortisol, vitamin B12, folate, liver function, kidney function, electrolytes, CBC, A1c, and thyroid levels.    8. Trigger Finger.  He reports experiencing trigger finger, particularly in the morning and at night. No specific treatment was discussed during this visit.    9. Health Maintenance.  An influenza vaccine was administered today.       BMI is within normal parameters. No other follow-up for BMI required.     Patient was given instructions and counseling regarding his condition or for health maintenance advice. Please see specific information pulled into the AVS if appropriate.     Follow Up   Return in about 3 months (around 2/5/2025) for Annual physical.    Patient or patient representative verbalized consent for the use of Ambient Listening during the visit with  WILLY Sanabria for chart documentation. 11/5/2024  13:20 WILLY Ge

## 2024-11-05 ENCOUNTER — OFFICE VISIT (OUTPATIENT)
Dept: INTERNAL MEDICINE | Age: 29
End: 2024-11-05
Payer: COMMERCIAL

## 2024-11-05 VITALS
WEIGHT: 186.6 LBS | DIASTOLIC BLOOD PRESSURE: 90 MMHG | BODY MASS INDEX: 25.27 KG/M2 | TEMPERATURE: 98.4 F | HEART RATE: 89 BPM | SYSTOLIC BLOOD PRESSURE: 145 MMHG | OXYGEN SATURATION: 100 % | HEIGHT: 72 IN

## 2024-11-05 DIAGNOSIS — E55.9 VITAMIN D DEFICIENCY: ICD-10-CM

## 2024-11-05 DIAGNOSIS — R79.89 LOW SERUM TESTOSTERONE LEVEL: ICD-10-CM

## 2024-11-05 DIAGNOSIS — R79.89 HIGH SERUM CORTISOL: ICD-10-CM

## 2024-11-05 DIAGNOSIS — R03.0 ELEVATED BLOOD-PRESSURE READING WITHOUT DIAGNOSIS OF HYPERTENSION: ICD-10-CM

## 2024-11-05 DIAGNOSIS — Z72.89 CURRENT EVERY DAY VAPING: ICD-10-CM

## 2024-11-05 DIAGNOSIS — R79.89 LOW TESTOSTERONE: ICD-10-CM

## 2024-11-05 DIAGNOSIS — R79.89 ELEVATED PROLACTIN LEVEL: Primary | ICD-10-CM

## 2024-11-05 DIAGNOSIS — Z23 NEED FOR INFLUENZA VACCINATION: ICD-10-CM

## 2024-11-05 DIAGNOSIS — R53.82 CHRONIC FATIGUE: ICD-10-CM

## 2024-11-05 DIAGNOSIS — R79.89 ELEVATED PROLACTIN LEVEL: ICD-10-CM

## 2024-11-05 DIAGNOSIS — R53.83 FATIGUE, UNSPECIFIED TYPE: ICD-10-CM

## 2024-11-05 DIAGNOSIS — F90.9 ATTENTION DEFICIT HYPERACTIVITY DISORDER (ADHD), UNSPECIFIED ADHD TYPE: Primary | ICD-10-CM

## 2024-11-05 PROCEDURE — 90471 IMMUNIZATION ADMIN: CPT | Performed by: NURSE PRACTITIONER

## 2024-11-05 PROCEDURE — 90656 IIV3 VACC NO PRSV 0.5 ML IM: CPT | Performed by: NURSE PRACTITIONER

## 2024-11-05 PROCEDURE — 99214 OFFICE O/P EST MOD 30 MIN: CPT | Performed by: NURSE PRACTITIONER

## 2024-11-08 ENCOUNTER — LAB (OUTPATIENT)
Dept: LAB | Facility: HOSPITAL | Age: 29
End: 2024-11-08
Payer: COMMERCIAL

## 2024-11-08 DIAGNOSIS — R79.89 LOW TESTOSTERONE: ICD-10-CM

## 2024-11-08 DIAGNOSIS — R79.89 ELEVATED PROLACTIN LEVEL: ICD-10-CM

## 2024-11-08 DIAGNOSIS — R53.82 CHRONIC FATIGUE: ICD-10-CM

## 2024-11-08 DIAGNOSIS — R79.89 HIGH SERUM CORTISOL: ICD-10-CM

## 2024-11-08 LAB
ALBUMIN SERPL-MCNC: 4.5 G/DL (ref 3.5–5.2)
ALBUMIN/GLOB SERPL: 1.7 G/DL
ALP SERPL-CCNC: 76 U/L (ref 39–117)
ALT SERPL W P-5'-P-CCNC: 22 U/L (ref 1–41)
ANION GAP SERPL CALCULATED.3IONS-SCNC: 9.8 MMOL/L (ref 5–15)
AST SERPL-CCNC: 20 U/L (ref 1–40)
BILIRUB SERPL-MCNC: 0.6 MG/DL (ref 0–1.2)
BUN SERPL-MCNC: 12 MG/DL (ref 6–20)
BUN/CREAT SERPL: 13.5 (ref 7–25)
CALCIUM SPEC-SCNC: 9.6 MG/DL (ref 8.6–10.5)
CHLORIDE SERPL-SCNC: 102 MMOL/L (ref 98–107)
CO2 SERPL-SCNC: 28.2 MMOL/L (ref 22–29)
CORTIS AM PEAK SERPL-MCNC: 16.56 MCG/DL (ref 6.02–18.4)
CREAT SERPL-MCNC: 0.89 MG/DL (ref 0.76–1.27)
EGFRCR SERPLBLD CKD-EPI 2021: 120.5 ML/MIN/1.73
ESTRADIOL SERPL HS-MCNC: 15.4 PG/ML
FSH SERPL-ACNC: 5.21 MIU/ML
GLOBULIN UR ELPH-MCNC: 2.7 GM/DL
GLUCOSE SERPL-MCNC: 98 MG/DL (ref 65–99)
LH SERPL-ACNC: 4.89 MIU/ML
POTASSIUM SERPL-SCNC: 4.3 MMOL/L (ref 3.5–5.2)
PROLACTIN SERPL-MCNC: 15.7 NG/ML (ref 4.04–15.2)
PROT SERPL-MCNC: 7.2 G/DL (ref 6–8.5)
SODIUM SERPL-SCNC: 140 MMOL/L (ref 136–145)
T4 FREE SERPL-MCNC: 1.37 NG/DL (ref 0.92–1.68)
TESTOST SERPL-MCNC: 359 NG/DL (ref 249–836)
TSH SERPL DL<=0.05 MIU/L-ACNC: 2.21 UIU/ML (ref 0.27–4.2)

## 2024-11-08 PROCEDURE — 84146 ASSAY OF PROLACTIN: CPT

## 2024-11-08 PROCEDURE — 82024 ASSAY OF ACTH: CPT

## 2024-11-08 PROCEDURE — 36415 COLL VENOUS BLD VENIPUNCTURE: CPT

## 2024-11-08 PROCEDURE — 82670 ASSAY OF TOTAL ESTRADIOL: CPT

## 2024-11-08 PROCEDURE — 80053 COMPREHEN METABOLIC PANEL: CPT

## 2024-11-08 PROCEDURE — 84439 ASSAY OF FREE THYROXINE: CPT

## 2024-11-08 PROCEDURE — 82533 TOTAL CORTISOL: CPT

## 2024-11-08 PROCEDURE — 84270 ASSAY OF SEX HORMONE GLOBUL: CPT

## 2024-11-08 PROCEDURE — 84443 ASSAY THYROID STIM HORMONE: CPT

## 2024-11-08 PROCEDURE — 84305 ASSAY OF SOMATOMEDIN: CPT

## 2024-11-08 PROCEDURE — 84403 ASSAY OF TOTAL TESTOSTERONE: CPT

## 2024-11-08 PROCEDURE — 83002 ASSAY OF GONADOTROPIN (LH): CPT

## 2024-11-08 PROCEDURE — 82627 DEHYDROEPIANDROSTERONE: CPT

## 2024-11-08 PROCEDURE — 83001 ASSAY OF GONADOTROPIN (FSH): CPT

## 2024-11-09 LAB
ACTH PLAS-MCNC: 33.5 PG/ML (ref 7.2–63.3)
DHEA-S SERPL-MCNC: 474 UG/DL (ref 138.5–475.2)
SHBG SERPL-SCNC: 15.5 NMOL/L (ref 16.5–55.9)

## 2024-11-12 LAB
IGF-I SERPL-MCNC: 340 NG/ML (ref 101–307)
IGF-I Z-SCORE SERPL: 2.3 S.D.

## 2024-11-26 ENCOUNTER — TELEMEDICINE (OUTPATIENT)
Dept: ENDOCRINOLOGY | Age: 29
End: 2024-11-26
Payer: COMMERCIAL

## 2024-11-26 DIAGNOSIS — R79.89 LOW TESTOSTERONE: ICD-10-CM

## 2024-11-26 DIAGNOSIS — R79.89 ELEVATED PROLACTIN LEVEL: Primary | ICD-10-CM

## 2024-11-26 DIAGNOSIS — R53.82 CHRONIC FATIGUE: ICD-10-CM

## 2024-11-26 DIAGNOSIS — R79.89 HIGH SERUM CORTISOL: ICD-10-CM

## 2024-11-26 PROCEDURE — 99214 OFFICE O/P EST MOD 30 MIN: CPT | Performed by: STUDENT IN AN ORGANIZED HEALTH CARE EDUCATION/TRAINING PROGRAM

## 2024-11-26 NOTE — PROGRESS NOTES
"Chief Complaint  Low testosterone       Subjective        Gaetano Nash presents to [facility] ENDOCRINOLOGY for follow up.     History of Present Illness    You have chosen to receive care through a telehealth visit.  Do you consent to use a video/audio connection for your medical care today? Yes    Presenting symptoms: fatigue for years   Complains of fatigue and low energy level  Total testosterone normal  Calculated free testosterone is 10 within the normal range  Denies headaches or any changes in his vision  Would like to have more kids in the future  Denies breast enlargement or breast discharge  No history of sleep apnea  No changes in his weight  Does not have diabetes  Denies purple striae  Denies chronic steroid intake or pain medications   H/o blood clots: No    H/o prostate cancer: Maternal grandfather had prostate cancer     Noted to have abnormal IGF1, high normal cortisol and slightly elevated prolactin       Objective   Vital Signs:  There were no vitals taken for this visit.  Estimated body mass index is 25.31 kg/m² as calculated from the following:    Height as of 11/5/24: 182.9 cm (72\").    Weight as of 11/5/24: 84.6 kg (186 lb 9.6 oz).            Result Review :  The following data was reviewed by: Mahrokh Nokhbehzaeim, MD on 11/26/2024:  CMP          1/31/2024    10:14 2/19/2024    08:47 11/8/2024    09:31   CMP   Glucose 83  91  98    BUN 16  12  12    Creatinine 0.90  0.87  0.89    EGFR 120.8  122.0  120.5    Sodium 139  141  140    Potassium 4.2  4.5  4.3    Chloride 100  103  102    Calcium 9.6  9.2  9.6    Total Protein 7.5  6.9  7.2    Albumin 4.9  4.3  4.5    Globulin 2.6  2.6  2.7    Total Bilirubin 0.7  0.3  0.6    Alkaline Phosphatase 75  70  76    AST (SGOT) 19  16  20    ALT (SGPT) 19  18  22    Albumin/Globulin Ratio 1.9  1.7  1.7    BUN/Creatinine Ratio 17.8  13.8  13.5    Anion Gap 14.6  10.1  9.8      TSH          1/31/2024    10:14 11/8/2024    09:31   TSH "   TSH 1.810  2.210               Assessment and Plan   Diagnoses and all orders for this visit:    1. Elevated prolactin level (Primary)  Assessment & Plan:  Denies headaches or any changes in his vision  Does not take any medication which could affect prolactin level  Repeat prolactin level  Pituitary MRI ordered    Orders:  -     Prolactin; Future  -     Insulin-like Growth Factor-1 (IGF-1) With Z Score; Future  -     Cortisol - AM; Future  -     ACTH; Future  -     MRI Pituitary With & Without Contrast; Future    2. High serum cortisol  Assessment & Plan:  Complains of fatigue  No diabetes or recent weight gain  Will repeat a.m. cortisol and ACTH  Further testing and management based on results    Orders:  -     Prolactin; Future  -     Insulin-like Growth Factor-1 (IGF-1) With Z Score; Future  -     Cortisol - AM; Future  -     ACTH; Future  -     MRI Pituitary With & Without Contrast; Future    3. Low testosterone  Assessment & Plan:  Complains of fatigue  Repeat total testosterone and calculated free testosterone within the normal range        4. Chronic fatigue  Assessment & Plan:  Noted to have abnormal serum cortisol prolactin and IGF-I  Will repeat fasting prolactin, IGF-I and cortisol level  Pituitary MRI ordered  Further testing and management based on results    Orders:  -     MRI Pituitary With & Without Contrast; Future    6m 33s           Follow Up   Return in about 4 months (around 3/26/2025).    Patient was given instructions and counseling regarding his condition or for health maintenance advice. Please see specific information pulled into the AVS if appropriate.     Mode of Visit: Video  Location of patient: -HOME-  Location of provider[facility]  You have chosen to receive care through a telehealth visit.  The patient has signed the video visit consent form.  The visit included audio and video interaction. No technical issues occurred during this visit.

## 2024-11-26 NOTE — ASSESSMENT & PLAN NOTE
Noted to have abnormal serum cortisol prolactin and IGF-I  Will repeat fasting prolactin, IGF-I and cortisol level  Pituitary MRI ordered  Further testing and management based on results

## 2024-11-26 NOTE — ASSESSMENT & PLAN NOTE
Complains of fatigue  No diabetes or recent weight gain  Will repeat a.m. cortisol and ACTH  Further testing and management based on results

## 2024-11-26 NOTE — ASSESSMENT & PLAN NOTE
Denies headaches or any changes in his vision  Does not take any medication which could affect prolactin level  Repeat prolactin level  Pituitary MRI ordered

## 2024-11-26 NOTE — ASSESSMENT & PLAN NOTE
Complains of fatigue  Repeat total testosterone and calculated free testosterone within the normal range

## 2025-01-02 ENCOUNTER — TELEPHONE (OUTPATIENT)
Dept: ENDOCRINOLOGY | Age: 30
End: 2025-01-02
Payer: COMMERCIAL

## 2025-01-09 ENCOUNTER — TELEPHONE (OUTPATIENT)
Dept: ENDOCRINOLOGY | Age: 30
End: 2025-01-09
Payer: COMMERCIAL

## 2025-01-09 NOTE — TELEPHONE ENCOUNTER
Called and spoke with patient to see if he would like to schedule a follow up appointment.     He said that he would not like to continue being seen at the office.
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
## 2025-03-11 ENCOUNTER — OFFICE VISIT (OUTPATIENT)
Dept: PSYCHIATRY | Facility: CLINIC | Age: 30
End: 2025-03-11
Payer: COMMERCIAL

## 2025-03-11 ENCOUNTER — LAB (OUTPATIENT)
Dept: LAB | Facility: HOSPITAL | Age: 30
End: 2025-03-11
Payer: COMMERCIAL

## 2025-03-11 VITALS
BODY MASS INDEX: 25.31 KG/M2 | SYSTOLIC BLOOD PRESSURE: 143 MMHG | HEART RATE: 77 BPM | DIASTOLIC BLOOD PRESSURE: 73 MMHG | HEIGHT: 72 IN

## 2025-03-11 DIAGNOSIS — F90.9 ADULT ADHD: ICD-10-CM

## 2025-03-11 DIAGNOSIS — F43.23 ADJUSTMENT DISORDER WITH MIXED ANXIETY AND DEPRESSED MOOD: ICD-10-CM

## 2025-03-11 DIAGNOSIS — F90.9 ADULT ADHD: Primary | ICD-10-CM

## 2025-03-11 LAB
AMPHET+METHAMPHET UR QL: NEGATIVE
AMPHETAMINES UR QL: NEGATIVE
BARBITURATES UR QL SCN: NEGATIVE
BENZODIAZ UR QL SCN: NEGATIVE
BUPRENORPHINE SERPL-MCNC: NEGATIVE NG/ML
CANNABINOIDS SERPL QL: POSITIVE
COCAINE UR QL: NEGATIVE
FENTANYL UR-MCNC: NEGATIVE NG/ML
METHADONE UR QL SCN: NEGATIVE
OPIATES UR QL: NEGATIVE
OXYCODONE UR QL SCN: NEGATIVE
PCP UR QL SCN: NEGATIVE
TRICYCLICS UR QL SCN: NEGATIVE

## 2025-03-11 PROCEDURE — 80307 DRUG TEST PRSMV CHEM ANLYZR: CPT

## 2025-03-11 RX ORDER — DEXTROAMPHETAMINE SACCHARATE, AMPHETAMINE ASPARTATE, DEXTROAMPHETAMINE SULFATE AND AMPHETAMINE SULFATE 2.5; 2.5; 2.5; 2.5 MG/1; MG/1; MG/1; MG/1
10 TABLET ORAL DAILY
Qty: 30 TABLET | Refills: 0 | Status: SHIPPED | OUTPATIENT
Start: 2025-03-11 | End: 2025-04-10

## 2025-03-11 NOTE — TREATMENT PLAN
Multi-Disciplinary Problems (from Behavioral Health Treatment Plan)      Active Problems       Problem: Adjustment  Start Date: 03/11/25      Problem Details: The patient self-scales this problem as a 3 with 10 being the worst.          Goal Priority Start Date Expected End Date End Date    Patient will implement healthy coping strategies. -- 03/11/25 09/09/25 --    Goal Details: Progress toward goal:  The patient self-scales their progress related to this goal as a 3 with 10 being the worst.        Goal Intervention Frequency Start Date End Date    Assist patient to identify and develop healthy coping strategies Weekly 03/11/25 --    Intervention Details: Duration of treatment until remission of symptoms.                Problem: Anxiety  Start Date: 03/11/25      Problem Details: The patient self-scales this problem as a 3 with 10 being the worst.          Goal Priority Start Date Expected End Date End Date    Patient will develop and implement behavioral and cognitive strategies to reduce anxiety and irrational fears. -- 03/11/25 09/09/25 --    Goal Details: Progress toward goal:  The patient self-scales their progress related to this goal as a 3 with 10 being the worst.        Goal Intervention Frequency Start Date End Date    Help patient explore past emotional issues in relation to present anxiety. Weekly 03/11/25 --    Intervention Details: Duration of treatment until remission of symptoms.        Goal Intervention Frequency Start Date End Date    Help patient develop an awareness of their cognitive and physical responses to anxiety. Weekly 03/11/25 --    Intervention Details: Duration of treatment until remission of symptoms.                Problem: Autism Disorder w/Peds  Start Date: 03/11/25      Problem Details: The patient self-scales this problem as a 3 with 10 being the worst.        Goal Priority Start Date Expected End Date End Date    Decrease the frequency and severity of temper outburts and erratic  shifts in mood. -- 03/11/25 09/09/25 --    Goal Details: Progress toward goal:  The patient self-scales their progress related to this goal as a 3 with 10 being the worst.        Goal Intervention Frequency Start Date End Date    Develop a rewards system or contingency contract to improve the patients social skills and anger control. Weekly 03/11/25 --    Intervention Details: Duration of treatment until remission of symptoms.        Goal Intervention Frequency Start Date End Date    Teach the parents behavioral management techniques to decrease patients idiosyncratic speech, excessive self stimulation, temper outbursts, and self abusive behaviors. Weekly 03/11/25 --    Intervention Details: Duration of treatment until remission of symptoms.                        Reviewed By       Jessica Luna, APRN 03/11/25 1002
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
## 2025-04-16 ENCOUNTER — OFFICE VISIT (OUTPATIENT)
Dept: PSYCHIATRY | Facility: CLINIC | Age: 30
End: 2025-04-16
Payer: COMMERCIAL

## 2025-04-16 VITALS
HEART RATE: 74 BPM | HEIGHT: 72 IN | BODY MASS INDEX: 26.38 KG/M2 | SYSTOLIC BLOOD PRESSURE: 152 MMHG | WEIGHT: 194.8 LBS | DIASTOLIC BLOOD PRESSURE: 70 MMHG

## 2025-04-16 DIAGNOSIS — F40.10 SOCIAL ANXIETY DISORDER: Primary | ICD-10-CM

## 2025-04-16 DIAGNOSIS — F90.9 ADULT ADHD: ICD-10-CM

## 2025-04-16 DIAGNOSIS — F43.23 ADJUSTMENT DISORDER WITH MIXED ANXIETY AND DEPRESSED MOOD: ICD-10-CM

## 2025-04-16 RX ORDER — LISDEXAMFETAMINE DIMESYLATE 20 MG/1
20 CAPSULE ORAL EVERY MORNING
Qty: 30 CAPSULE | Refills: 0 | Status: SHIPPED | OUTPATIENT
Start: 2025-04-16 | End: 2025-05-16

## 2025-04-16 RX ORDER — VENLAFAXINE HYDROCHLORIDE 37.5 MG/1
37.5 CAPSULE, EXTENDED RELEASE ORAL DAILY
Qty: 30 CAPSULE | Refills: 2 | Status: SHIPPED | OUTPATIENT
Start: 2025-04-16 | End: 2025-07-15
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
## 2025-05-21 ENCOUNTER — OFFICE VISIT (OUTPATIENT)
Dept: PSYCHIATRY | Facility: CLINIC | Age: 30
End: 2025-05-21
Payer: COMMERCIAL

## 2025-05-21 VITALS
HEIGHT: 72 IN | HEART RATE: 72 BPM | BODY MASS INDEX: 27.04 KG/M2 | WEIGHT: 199.6 LBS | SYSTOLIC BLOOD PRESSURE: 140 MMHG | DIASTOLIC BLOOD PRESSURE: 76 MMHG

## 2025-05-21 DIAGNOSIS — F40.10 SOCIAL ANXIETY DISORDER: ICD-10-CM

## 2025-05-21 DIAGNOSIS — F90.9 ADULT ADHD: Primary | ICD-10-CM

## 2025-05-21 DIAGNOSIS — F43.23 ADJUSTMENT DISORDER WITH MIXED ANXIETY AND DEPRESSED MOOD: ICD-10-CM

## 2025-05-21 RX ORDER — LISDEXAMFETAMINE DIMESYLATE 30 MG/1
30 CAPSULE ORAL EVERY MORNING
Qty: 30 CAPSULE | Refills: 0 | Status: SHIPPED | OUTPATIENT
Start: 2025-05-21 | End: 2025-06-20

## 2025-06-20 ENCOUNTER — CLINICAL SUPPORT (OUTPATIENT)
Dept: INTERNAL MEDICINE | Age: 30
End: 2025-06-20
Payer: COMMERCIAL

## 2025-06-20 DIAGNOSIS — E55.9 VITAMIN D DEFICIENCY: ICD-10-CM

## 2025-06-20 DIAGNOSIS — F90.9 ADULT ADHD: ICD-10-CM

## 2025-06-20 DIAGNOSIS — R53.83 FATIGUE, UNSPECIFIED TYPE: ICD-10-CM

## 2025-06-20 DIAGNOSIS — R79.89 LOW SERUM TESTOSTERONE LEVEL: ICD-10-CM

## 2025-06-20 LAB
25(OH)D3 SERPL-MCNC: 21 NG/ML (ref 30–100)
ALBUMIN SERPL-MCNC: 4.4 G/DL (ref 3.5–5.2)
ALBUMIN/GLOB SERPL: 1.4 G/DL
ALP SERPL-CCNC: 97 U/L (ref 39–117)
ALT SERPL W P-5'-P-CCNC: 22 U/L (ref 1–41)
ANION GAP SERPL CALCULATED.3IONS-SCNC: 11.7 MMOL/L (ref 5–15)
AST SERPL-CCNC: 24 U/L (ref 1–40)
BASOPHILS # BLD AUTO: 0.04 10*3/MM3 (ref 0–0.2)
BASOPHILS NFR BLD AUTO: 0.5 % (ref 0–1.5)
BILIRUB SERPL-MCNC: 0.3 MG/DL (ref 0–1.2)
BUN SERPL-MCNC: 16 MG/DL (ref 6–20)
BUN/CREAT SERPL: 16 (ref 7–25)
CALCIUM SPEC-SCNC: 9.6 MG/DL (ref 8.6–10.5)
CHLORIDE SERPL-SCNC: 100 MMOL/L (ref 98–107)
CO2 SERPL-SCNC: 26.3 MMOL/L (ref 22–29)
CORTIS AM PEAK SERPL-MCNC: 18.11 MCG/DL (ref 6.02–18.4)
CREAT SERPL-MCNC: 1 MG/DL (ref 0.76–1.27)
DEPRECATED RDW RBC AUTO: 43.5 FL (ref 37–54)
EGFRCR SERPLBLD CKD-EPI 2021: 105.1 ML/MIN/1.73
EOSINOPHIL # BLD AUTO: 0.12 10*3/MM3 (ref 0–0.4)
EOSINOPHIL NFR BLD AUTO: 1.5 % (ref 0.3–6.2)
ERYTHROCYTE [DISTWIDTH] IN BLOOD BY AUTOMATED COUNT: 12.5 % (ref 12.3–15.4)
FOLATE SERPL-MCNC: 8.14 NG/ML (ref 4.78–24.2)
FSH SERPL-ACNC: 5.23 MIU/ML
GLOBULIN UR ELPH-MCNC: 3.1 GM/DL
GLUCOSE SERPL-MCNC: 94 MG/DL (ref 65–99)
HBA1C MFR BLD: 5.2 % (ref 4.8–5.6)
HCT VFR BLD AUTO: 47.6 % (ref 37.5–51)
HGB BLD-MCNC: 15.7 G/DL (ref 13–17.7)
IMM GRANULOCYTES # BLD AUTO: 0.02 10*3/MM3 (ref 0–0.05)
IMM GRANULOCYTES NFR BLD AUTO: 0.3 % (ref 0–0.5)
LH SERPL-ACNC: 7.52 MIU/ML
LYMPHOCYTES # BLD AUTO: 2.64 10*3/MM3 (ref 0.7–3.1)
LYMPHOCYTES NFR BLD AUTO: 34 % (ref 19.6–45.3)
MCH RBC QN AUTO: 30.9 PG (ref 26.6–33)
MCHC RBC AUTO-ENTMCNC: 33 G/DL (ref 31.5–35.7)
MCV RBC AUTO: 93.7 FL (ref 79–97)
MONOCYTES # BLD AUTO: 0.67 10*3/MM3 (ref 0.1–0.9)
MONOCYTES NFR BLD AUTO: 8.6 % (ref 5–12)
NEUTROPHILS NFR BLD AUTO: 4.28 10*3/MM3 (ref 1.7–7)
NEUTROPHILS NFR BLD AUTO: 55.1 % (ref 42.7–76)
NRBC BLD AUTO-RTO: 0 /100 WBC (ref 0–0.2)
PLATELET # BLD AUTO: 288 10*3/MM3 (ref 140–450)
PMV BLD AUTO: 8.9 FL (ref 6–12)
POTASSIUM SERPL-SCNC: 4.2 MMOL/L (ref 3.5–5.2)
PROLACTIN SERPL-MCNC: 20.1 NG/ML (ref 4.04–15.2)
PROT SERPL-MCNC: 7.5 G/DL (ref 6–8.5)
RBC # BLD AUTO: 5.08 10*6/MM3 (ref 4.14–5.8)
SODIUM SERPL-SCNC: 138 MMOL/L (ref 136–145)
TSH SERPL DL<=0.05 MIU/L-ACNC: 2.38 UIU/ML (ref 0.27–4.2)
VIT B12 BLD-MCNC: 953 PG/ML (ref 211–946)
WBC NRBC COR # BLD AUTO: 7.77 10*3/MM3 (ref 3.4–10.8)

## 2025-06-20 PROCEDURE — 84270 ASSAY OF SEX HORMONE GLOBUL: CPT | Performed by: NURSE PRACTITIONER

## 2025-06-20 PROCEDURE — 82533 TOTAL CORTISOL: CPT | Performed by: NURSE PRACTITIONER

## 2025-06-20 PROCEDURE — 83036 HEMOGLOBIN GLYCOSYLATED A1C: CPT | Performed by: NURSE PRACTITIONER

## 2025-06-20 PROCEDURE — 83001 ASSAY OF GONADOTROPIN (FSH): CPT | Performed by: NURSE PRACTITIONER

## 2025-06-20 PROCEDURE — 83002 ASSAY OF GONADOTROPIN (LH): CPT | Performed by: NURSE PRACTITIONER

## 2025-06-20 PROCEDURE — 84403 ASSAY OF TOTAL TESTOSTERONE: CPT | Performed by: NURSE PRACTITIONER

## 2025-06-20 PROCEDURE — 84146 ASSAY OF PROLACTIN: CPT | Performed by: NURSE PRACTITIONER

## 2025-06-20 PROCEDURE — 36415 COLL VENOUS BLD VENIPUNCTURE: CPT | Performed by: NURSE PRACTITIONER

## 2025-06-20 PROCEDURE — 82306 VITAMIN D 25 HYDROXY: CPT | Performed by: NURSE PRACTITIONER

## 2025-06-20 PROCEDURE — 82746 ASSAY OF FOLIC ACID SERUM: CPT | Performed by: NURSE PRACTITIONER

## 2025-06-20 PROCEDURE — 80050 GENERAL HEALTH PANEL: CPT | Performed by: NURSE PRACTITIONER

## 2025-06-20 PROCEDURE — 82607 VITAMIN B-12: CPT | Performed by: NURSE PRACTITIONER

## 2025-06-21 LAB — SHBG SERPL-SCNC: 11.9 NMOL/L (ref 16.5–55.9)

## 2025-06-23 RX ORDER — LISDEXAMFETAMINE DIMESYLATE 30 MG/1
30 CAPSULE ORAL EVERY MORNING
Qty: 30 CAPSULE | Refills: 0 | Status: SHIPPED | OUTPATIENT
Start: 2025-06-23 | End: 2025-07-23

## 2025-06-23 NOTE — TELEPHONE ENCOUNTER
CONTROLLED MEDICATION REFILL REQUEST    STATE REGULATION APPT EVERY 3 MONTHS     UDS(URINE DRUG SCREEN) EVERY 6 MONTHS OR UP TO PROVIDER PREFERENCE   (ALEXANDRE ALEJANDRA & SUNDAY 1 PER YEAR)     NEW NARC CONSENT EVERY YEAR      MEDICATION: lisdexamfetamine (VYVANSE) 30 MG capsule (05/21/2025)      NEXT OFFICE VISIT: Appointment with Jessica Luna APRN (07/22/2025)     LAST OFFICE VISIT: Office Visit with Jessica Luna APRN (05/21/2025)     NARC CONSENT: CONTROLLED SUBSTANCE AGREEMENT - SCAN - [facility], , [phone] (03/12/2025)     URINE DRUG SCREEN(STANDING ORDER)   (ALEXANDRE ALEJANDRA & BRAND 1 PER YEAR): Urine Drug Screen - Urine, Clean Catch (03/11/2025 14:50)  Fentanyl, Urine - Urine, Clean Catch (03/11/2025 14:50)    PROVIDER PLEASE ADVISE

## 2025-06-27 LAB — TESTOST SERPL-MCNC: 359 NG/DL

## 2025-07-22 DIAGNOSIS — F90.9 ADULT ADHD: ICD-10-CM

## 2025-07-22 DIAGNOSIS — F43.23 ADJUSTMENT DISORDER WITH MIXED ANXIETY AND DEPRESSED MOOD: ICD-10-CM

## 2025-07-22 RX ORDER — VENLAFAXINE HYDROCHLORIDE 37.5 MG/1
37.5 CAPSULE, EXTENDED RELEASE ORAL DAILY
Qty: 30 CAPSULE | Refills: 2 | Status: SHIPPED | OUTPATIENT
Start: 2025-07-22 | End: 2025-10-20

## 2025-07-22 RX ORDER — LISDEXAMFETAMINE DIMESYLATE 30 MG/1
30 CAPSULE ORAL EVERY MORNING
Qty: 30 CAPSULE | Refills: 0 | Status: SHIPPED | OUTPATIENT
Start: 2025-07-22 | End: 2025-08-21

## 2025-07-22 NOTE — TELEPHONE ENCOUNTER
CONTROLLED MEDICATION REFILL REQUEST    STATE REGULATION APPT EVERY 3 MONTHS     UDS(URINE DRUG SCREEN) EVERY 6 MONTHS OR UP TO PROVIDER PREFERENCE   (DE JUSTYN & BRAND 1 PER YEAR)     NEW NARC CONSENT EVERY YEAR      MEDICATION: lisdexamfetamine (VYVANSE) 30 MG capsule (06/23/2025)  venlafaxine XR (Effexor XR) 37.5 MG 24 hr capsule (04/16/2025)     NEXT OFFICE VISIT: NONE IN EPIC     LAST OFFICE VISIT: Office Visit with Jessica Luna APRN (05/21/2025)     NARC CONSENT: CONTROLLED SUBSTANCE AGREEMENT - SCAN - [facility], , [phone] (03/12/2025)     URINE DRUG SCREEN(STANDING ORDER)   (DE JUSTYN & BRAND 1 PER YEAR): Urine Drug Screen - Urine, Clean Catch (03/11/2025 14:50)

## 2025-07-22 NOTE — TELEPHONE ENCOUNTER
PT(PATIENT) VERIFIED     NEXT APPT WITH PROVIDER   Appointment with Jessica Luna APRN (2025)     PLEASE ADVISE

## 2025-07-31 ENCOUNTER — TELEMEDICINE (OUTPATIENT)
Dept: PSYCHIATRY | Facility: CLINIC | Age: 30
End: 2025-07-31
Payer: COMMERCIAL

## 2025-07-31 DIAGNOSIS — F40.10 SOCIAL ANXIETY DISORDER: ICD-10-CM

## 2025-07-31 DIAGNOSIS — F43.23 ADJUSTMENT DISORDER WITH MIXED ANXIETY AND DEPRESSED MOOD: ICD-10-CM

## 2025-07-31 DIAGNOSIS — F90.9 ADULT ADHD: Primary | ICD-10-CM
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
## 2025-08-20 DIAGNOSIS — F90.9 ADULT ADHD: ICD-10-CM

## 2025-08-20 DIAGNOSIS — F43.23 ADJUSTMENT DISORDER WITH MIXED ANXIETY AND DEPRESSED MOOD: ICD-10-CM

## 2025-08-21 RX ORDER — LISDEXAMFETAMINE DIMESYLATE 30 MG/1
30 CAPSULE ORAL EVERY MORNING
Qty: 30 CAPSULE | Refills: 0 | Status: SHIPPED | OUTPATIENT
Start: 2025-08-21 | End: 2025-09-20

## 2025-08-21 RX ORDER — VENLAFAXINE HYDROCHLORIDE 37.5 MG/1
37.5 CAPSULE, EXTENDED RELEASE ORAL DAILY
Qty: 30 CAPSULE | Refills: 2 | Status: SHIPPED | OUTPATIENT
Start: 2025-08-21 | End: 2025-11-19